# Patient Record
Sex: MALE | Race: WHITE | NOT HISPANIC OR LATINO | URBAN - NONMETROPOLITAN AREA
[De-identification: names, ages, dates, MRNs, and addresses within clinical notes are randomized per-mention and may not be internally consistent; named-entity substitution may affect disease eponyms.]

---

## 2017-04-06 ENCOUNTER — OFFICE VISIT (OUTPATIENT)
Dept: CARDIAC SURGERY | Facility: CLINIC | Age: 62
End: 2017-04-06

## 2017-04-06 DIAGNOSIS — Z95.2 S/P AVR (AORTIC VALVE REPLACEMENT): ICD-10-CM

## 2017-04-06 DIAGNOSIS — I71.20 THORACIC AORTIC ANEURYSM WITHOUT RUPTURE (HCC): Primary | ICD-10-CM

## 2017-04-06 PROCEDURE — 99212 OFFICE O/P EST SF 10 MIN: CPT | Performed by: THORACIC SURGERY (CARDIOTHORACIC VASCULAR SURGERY)

## 2017-04-07 VITALS
BODY MASS INDEX: 33.88 KG/M2 | DIASTOLIC BLOOD PRESSURE: 71 MMHG | WEIGHT: 242 LBS | HEART RATE: 59 BPM | OXYGEN SATURATION: 95 % | SYSTOLIC BLOOD PRESSURE: 132 MMHG | HEIGHT: 71 IN

## 2017-04-07 PROBLEM — Z95.2 S/P AVR (AORTIC VALVE REPLACEMENT): Status: ACTIVE | Noted: 2017-04-07

## 2017-04-07 PROBLEM — I71.20 THORACIC AORTIC ANEURYSM WITHOUT RUPTURE (HCC): Status: ACTIVE | Noted: 2017-04-07

## 2017-04-07 NOTE — PROGRESS NOTES
04/06/2017  Patient Information  Blake Banks                                                                                          PO   [facility] [address]   1955  'PCP/Referring Physician'  Tico Velez, DO  [phone]  No ref. provider found    Chief Complaint   Patient presents with   • Consult     NP PER DR. TICO VELEZ FOR THORACIC ANEURYSM 4.3CM       History of Present Illness:  Mr. Banks is a 61 year old gentleman whom I have seen in the past for aortic stenosis and performed an aortic valve replacement in 2013.  He is referred back to me at this time for a new finding of a 4.3 cm thoracic aneurysm.  He denies any chest pain or other problems.  Patient Active Problem List   Diagnosis   • Thoracic aortic aneurysm without rupture   • S/P AVR (aortic valve replacement)     Past Medical History:   Diagnosis Date   • Aortic stenosis      Past Surgical History:   Procedure Laterality Date   • AORTIC VALVE REPAIR/REPLACEMENT         Current Outpatient Prescriptions:   •  aspirin 81 MG tablet, Take  by mouth., Disp: , Rfl:   Allergies   Allergen Reactions   • Levaquin [Levofloxacin]      Social History     Social History   • Marital status:      Spouse name: N/A   • Number of children: N/A   • Years of education: N/A     Occupational History   • 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 Negative for chills, fever, night sweats and weight loss.   HENT: Positive for congestion. Negative for headaches, hearing loss, odynophagia and sore throat.   "  Cardiovascular: Positive for chest pain, dyspnea on exertion and palpitations. Negative for leg swelling and orthopnea.   Respiratory: Positive for shortness of breath. Negative for cough and hemoptysis.    Endocrine: Negative for cold intolerance, heat intolerance, polydipsia, polyphagia and polyuria.   Hematologic/Lymphatic: Does not bruise/bleed easily.   Skin: Negative for itching and rash.   Musculoskeletal: Negative for joint pain, joint swelling and myalgias.   Gastrointestinal: Negative for abdominal pain, constipation, diarrhea, hematemesis, hematochezia, melena, nausea and vomiting.   Genitourinary: Negative for dysuria, frequency and hematuria.   Neurological: Negative for focal weakness, numbness and seizures.   Psychiatric/Behavioral: Negative for suicidal ideas.   All other systems reviewed and are negative.    Vitals:    04/07/17 0939   BP: 132/71   BP Location: Left arm   Patient Position: Sitting   Pulse: 59   SpO2: 95%   Weight: 242 lb (110 kg)   Height: 71\" (180.3 cm)      Physical Exam  GENERAL APPEARANCE: Oriented x3, well-nourished, well developed, in no acute distress.  EYES:  Eyes anicteric.  EOMI.  PERRLA.   ENT:             Good dentition.  NECK: Supple without masses or thyromegaly.  LUNGS:        Decreased in the bases; no wheezing, rales or rhonchi.  CARDIOVASCULAR:  Regular rate and rhythm without murmur, rub or gallop.  There are no carotid bruits.  Sternum is stable.  GI:  Abdomen is soft, nontender, nondistended with normal bowel sounds.  No hepatosplenomegaly and no masses.  EXTREMITIES:  No cyanosis, clubbing or edema.  SKIN:  No ulcerations, petechiae or bruising.  MUSCULOSKELETAL:  Full range of motion with no deformity of extremities.  NEURO: Cranial nerves II-XII, motor and sensory exams are intact.  PSYCH: Alert and oriented; mood and affect good.    Labs/Imaging:  I obtained and reviewed the CT scan which demonstrated the ascending aorta to be 4.3 " cm.    Assessment/Plan:  We will see him back in one year with a CT scan of his chest.  Overall, he appears to be doing well.      Patient Active Problem List   Diagnosis   • Thoracic aortic aneurysm without rupture   • S/P AVR (aortic valve replacement)     Signed by: Tien Zheng M.D.    4/6/2017    Aurea Lynn transcribing on behalf of Tien Zheng MD dictating    CC:  Olvin Velez MD

## 2018-04-09 ENCOUNTER — TELEPHONE (OUTPATIENT)
Dept: CARDIAC SURGERY | Facility: CLINIC | Age: 63
End: 2018-04-09

## 2018-06-25 ENCOUNTER — TELEPHONE (OUTPATIENT)
Dept: CARDIAC SURGERY | Facility: CLINIC | Age: 63
End: 2018-06-25

## 2018-06-25 NOTE — TELEPHONE ENCOUNTER
I spoke with the pt's wife today in regards to the two recall letters we had sent to reach the pt regarding his yearly F/U with Dr. Zheng. She was under the impression that the pt could see Dr. Hurley for his aneurysm, I let her know that Dr. Hurley is a Cardiology doctor and did not specifically deal with aneurysm. She understood and would like to schedule her  to be seen. Verified pt's mailing address, phone number and insurance.

## 2018-06-27 DIAGNOSIS — I71.20 THORACIC AORTIC ANEURYSM WITHOUT RUPTURE (HCC): Primary | ICD-10-CM

## 2018-08-16 ENCOUNTER — OFFICE VISIT (OUTPATIENT)
Dept: CARDIAC SURGERY | Facility: CLINIC | Age: 63
End: 2018-08-16

## 2018-08-16 DIAGNOSIS — I71.20 THORACIC AORTIC ANEURYSM WITHOUT RUPTURE (HCC): Primary | ICD-10-CM

## 2018-08-16 PROCEDURE — 99212 OFFICE O/P EST SF 10 MIN: CPT | Performed by: THORACIC SURGERY (CARDIOTHORACIC VASCULAR SURGERY)

## 2018-08-17 VITALS
SYSTOLIC BLOOD PRESSURE: 109 MMHG | WEIGHT: 246.5 LBS | HEIGHT: 72 IN | BODY MASS INDEX: 33.39 KG/M2 | DIASTOLIC BLOOD PRESSURE: 81 MMHG | HEART RATE: 57 BPM | OXYGEN SATURATION: 96 %

## 2018-08-17 NOTE — PROGRESS NOTES
08/16/2018  Patient Information  Blake Banks                                                                                          PO   [facility] [address]   1955  'PCP/Referring Physician'  Agustin Olvin Campbell,   [phone]  No ref. provider found    Chief Complaint   Patient presents with   • Follow-up     1 yr fu with CT chest for ascending aneurysm    • Aortic Aneurysm       History of Present Illness:  Mr. Banks returns today for follow up of his thoracic aortic aneurysm.  Since last visit, he has been doing well.  He has no new health issues.  He denies any angina or congestive heart failure symptoms.  He denies any chest pain.    Patient Active Problem List   Diagnosis   • Thoracic aortic aneurysm without rupture (CMS/HCC)   • S/P AVR (aortic valve replacement)     Past Medical History:   Diagnosis Date   • Aortic stenosis    • Ascending aortic aneurysm (CMS/HCC)      Past Surgical History:   Procedure Laterality Date   • AORTIC VALVE REPAIR/REPLACEMENT         Current Outpatient Prescriptions:   •  aspirin 81 MG tablet, Take  by mouth., Disp: , Rfl:   Allergies   Allergen Reactions   • Levaquin [Levofloxacin]      Social History     Social History   • Marital status:      Spouse name: N/A   • Number of children: 2   • Years of education: N/A     Occupational History   • 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 Negative for chills, fever, night sweats and weight loss.   HENT: Positive for sore throat. Negative for hearing loss  "and odynophagia.    Eyes: Positive for pain.   Cardiovascular: Positive for dyspnea on exertion (black lung ). Negative for chest pain, leg swelling, orthopnea and palpitations.   Respiratory: Positive for shortness of breath (black lung ). Negative for cough and hemoptysis.    Endocrine: Positive for heat intolerance. Negative for cold intolerance, polydipsia, polyphagia and polyuria.   Hematologic/Lymphatic: Bruises/bleeds easily.   Skin: Negative for itching and rash.   Musculoskeletal: Negative for joint pain, joint swelling and myalgias.   Gastrointestinal: Positive for abdominal pain. Negative for constipation, diarrhea, hematemesis, hematochezia, melena, nausea and vomiting.   Genitourinary: Negative for dysuria, frequency and hematuria.   Neurological: Negative for focal weakness, headaches, numbness and seizures.   Psychiatric/Behavioral: Negative for suicidal ideas.   All other systems reviewed and are negative.    Vitals:    08/17/18 0930   BP: 109/81   BP Location: Right arm   Patient Position: Sitting   Pulse: 57   SpO2: 96%   Weight: 112 kg (246 lb 8 oz)   Height: 182.9 cm (72\")      Physical Exam  LUNGS:   Clear.  CARDIOVASCULAR:  Regular rhythm and rate.  GI:    Abdomen is soft and nontender.    Labs/Imaging:  His CT scan reveals his aorta to be essentially unchanged.    Assessment/Plan:  Mr. Banks, overall, appears to be doing satisfactory.  I have obtained and reviewed his CT scan. I will see him back in one year with a chest CT scan at that time.      Patient Active Problem List   Diagnosis   • Thoracic aortic aneurysm without rupture (CMS/HCC)   • S/P AVR (aortic valve replacement)     CC: DO Aurea Hong transcribing on behalf of Tien Zheng MD dictating.   "